# Patient Record
Sex: FEMALE | URBAN - NONMETROPOLITAN AREA
[De-identification: names, ages, dates, MRNs, and addresses within clinical notes are randomized per-mention and may not be internally consistent; named-entity substitution may affect disease eponyms.]

---

## 2023-02-21 ENCOUNTER — TELEPHONE (OUTPATIENT)
Dept: BARIATRICS/WEIGHT MGMT | Facility: CLINIC | Age: 43
End: 2023-02-21

## 2023-02-21 NOTE — TELEPHONE ENCOUNTER
LVM to remind them of their new patient appointment, to bring completed paperwork, sign up for [id number]. Please arrive 60 minutes early if these aren't completed. Please call back with any questions [phone]. Also this 1st appt may last up to 2 hrs due to meeting with 2 different providers.         Has signed up for [id number]

## 2023-03-01 ENCOUNTER — TELEPHONE (OUTPATIENT)
Dept: BARIATRICS/WEIGHT MGMT | Facility: CLINIC | Age: 43
End: 2023-03-01

## 2023-03-01 NOTE — TELEPHONE ENCOUNTER
LVM to remind them of their new patient appointment, to bring completed paperwork, sign up for [id number]. Please arrive 60 minutes early if these aren't completed. Please call back with any questions [phone]. Also this 1st appt may last up to 2 hrs due to meeting with 2 different providers.